# Patient Record
Sex: FEMALE | URBAN - NONMETROPOLITAN AREA
[De-identification: names, ages, dates, MRNs, and addresses within clinical notes are randomized per-mention and may not be internally consistent; named-entity substitution may affect disease eponyms.]

---

## 2023-05-26 ENCOUNTER — TELEPHONE (OUTPATIENT)
Dept: INTERNAL MEDICINE | Facility: CLINIC | Age: 83
End: 2023-05-26

## 2023-05-26 NOTE — TELEPHONE ENCOUNTER
Caller: ELVIA    Relationship: [facility]    Best call back number [phone]; [phone]    Who are you requesting to speak with (clinical staff, provider,  specific staff member): CLINICAL    What was the call regarding ELVIA FROM [facility] STATES THAT HE RECEIVED THE REQUEST FROM DR. OLIVO FOR [facility] AND STATES THAT THEY ARE PROCESSING THE REQUEST.     VERIFIED PATIENT TWICE WITH [facility].

## 2023-05-26 NOTE — TELEPHONE ENCOUNTER
Not a patient in this office. Could this have been related to Dr. Trivedi? We have never seen her nor transmitted any orders on her. Please speak with [facility] to notify them.

## 2023-10-09 ENCOUNTER — TELEPHONE (OUTPATIENT)
Dept: INTERNAL MEDICINE | Facility: CLINIC | Age: 83
End: 2023-10-09